# Patient Record
Sex: FEMALE | Race: WHITE | NOT HISPANIC OR LATINO | Employment: FULL TIME | URBAN - METROPOLITAN AREA
[De-identification: names, ages, dates, MRNs, and addresses within clinical notes are randomized per-mention and may not be internally consistent; named-entity substitution may affect disease eponyms.]

---

## 2020-08-24 ENCOUNTER — OFFICE VISIT (OUTPATIENT)
Dept: GASTROENTEROLOGY | Facility: CLINIC | Age: 35
End: 2020-08-24

## 2020-08-24 VITALS
DIASTOLIC BLOOD PRESSURE: 80 MMHG | HEART RATE: 104 BPM | TEMPERATURE: 97.8 F | WEIGHT: 119.6 LBS | SYSTOLIC BLOOD PRESSURE: 133 MMHG | HEIGHT: 64 IN | BODY MASS INDEX: 20.42 KG/M2

## 2020-08-24 DIAGNOSIS — K59.04 CHRONIC IDIOPATHIC CONSTIPATION: ICD-10-CM

## 2020-08-24 DIAGNOSIS — Z80.0 FAMILY HX OF COLON CANCER REQUIRING SCREENING COLONOSCOPY: ICD-10-CM

## 2020-08-24 DIAGNOSIS — R10.31 RIGHT LOWER QUADRANT ABDOMINAL PAIN: Primary | ICD-10-CM

## 2020-08-24 PROCEDURE — 99204 OFFICE O/P NEW MOD 45 MIN: CPT | Performed by: NURSE PRACTITIONER

## 2020-08-24 RX ORDER — LUBIPROSTONE 8 UG/1
8 CAPSULE ORAL 2 TIMES DAILY WITH MEALS
Qty: 60 CAPSULE | Refills: 11 | Status: SHIPPED | OUTPATIENT
Start: 2020-08-24

## 2020-08-24 RX ORDER — LAMOTRIGINE 150 MG/1
150 TABLET ORAL 2 TIMES DAILY
COMMUNITY

## 2020-08-24 NOTE — PROGRESS NOTES
GASTROENTEROLOGY OFFICE NOTE  Nery Saldivar  [id number]  1985    CARE TEAM  Patient Care Team:  Lili Chicas MD as PCP - General (Obstetrics and Gynecology)  Provider, No Known as PCP - Family Medicine    Referring Provider: Lili Chicas MD    Chief Complaint   Patient presents with   • Abdominal Pain        HISTORY OF PRESENT ILLNESS:  Nery is a 34-year-old female who presents today with complaints of chronic right lower quadrant abdominal pain that has been problematic for her for several years.  Her pain is more so in her side and flank but extends to her right lower quadrant at times.  She reports that she has had several CT scans over the years which only show constipation.  She has been evaluated by [facility]/g[facility]enterology in the past for this pain and most recently saw them again within the past month.  Her last colonoscopy was in , she was having the same pain at that time.  She reports the colonoscopy was normal.  She has a family history of colon cancer in her mother who was diagnosed and  at the age of 48 from colon cancer.    She reports that she has had this pain on and off for several years but in the past year she feels pain daily.  She does complain of constipation, having a bowel movement only once every week or so.  Previously she has been taking Linzess that helped for about 3 months but then seemed to lose its effectiveness.  At some point her formulary changed and it was not covered any longer.  She has since discontinued Linzess.  For the past month she has been taking Trulance 3 mg daily which has not helped her either.  She is giving herself enemas every 3 to 4 days to have a bowel movement.    She is recently seen her GYN for complaints of right lower quadrant pain.  There was discussion regarding possible exploratory lap for endometriosis but her gynecologist recommended further GI evaluation prior to proceeding.    PAST MEDICAL HISTORY  Past Medical History:    Diagnosis Date   • Constipation    • Mood disorder (CMS/HCC)         PAST SURGICAL HISTORY  History reviewed. No pertinent surgical history.     MEDICATIONS:    Current Outpatient Medications:   •  lamoTRIgine (LaMICtal) 150 MG tablet, Take 150 mg by mouth 2 (Two) Times a Day., Disp: , Rfl:   •  lubiprostone (Amitiza) 8 MCG capsule, Take 1 capsule by mouth 2 (Two) Times a Day With Meals., Disp: 60 capsule, Rfl: 11    ALLERGIES  No Known Allergies    FAMILY HISTORY:  Family History   Problem Relation Age of Onset   • Colon cancer Mother    • Colon polyps Maternal Aunt    • Colon polyps Paternal Aunt        SOCIAL HISTORY  Social History     Socioeconomic History   • Marital status: Single     Spouse name: Not on file   • Number of children: Not on file   • Years of education: Not on file   • Highest education level: Not on file   Tobacco Use   • Smoking status: Never Smoker   • Smokeless tobacco: Never Used   Substance and Sexual Activity   • Alcohol use: Yes     Comment: rarely   • Drug use: Never   • Sexual activity: Defer       REVIEW OF SYSTEMS  Review of Systems   Constitutional: Negative for activity change, appetite change, chills, diaphoresis, fatigue, fever, unexpected weight gain and unexpected weight loss.   HENT: Negative for congestion, dental problem, drooling, ear discharge, ear pain, facial swelling, hearing loss, mouth sores, nosebleeds, postnasal drip, rhinorrhea, sinus pressure, sneezing, sore throat, swollen glands, tinnitus, trouble swallowing and voice change.    Respiratory: Negative for apnea, cough, choking, chest tightness, shortness of breath, wheezing and stridor.    Cardiovascular: Negative for chest pain, palpitations and leg swelling.   Gastrointestinal: Positive for abdominal pain and constipation. Negative for abdominal distention, anal bleeding, blood in stool, diarrhea, nausea, rectal pain, vomiting, GERD and indigestion.   Endocrine: Negative for cold intolerance, heat  "intolerance, polydipsia, polyphagia and polyuria.   Musculoskeletal: Negative for arthralgias, back pain, gait problem, joint swelling, myalgias, neck pain, neck stiffness and bursitis.   Skin: Negative for color change, dry skin, rash and skin lesions.   Allergic/Immunologic: Negative for environmental allergies, food allergies and immunocompromised state.   Neurological: Negative for dizziness, tremors, seizures, syncope, facial asymmetry, speech difficulty, weakness, light-headedness, numbness, headache, memory problem and confusion.   Hematological: Negative for adenopathy. Does not bruise/bleed easily.   Psychiatric/Behavioral: Negative for agitation, behavioral problems, decreased concentration, dysphoric mood, hallucinations, self-injury, sleep disturbance, suicidal ideas, negative for hyperactivity, depressed mood and stress. The patient is not nervous/anxious.          PHYSICAL EXAM   /80 (BP Location: Right arm, Patient Position: Sitting, Cuff Size: Adult)   Pulse 104   Temp 97.8 °F (36.6 °C) (Temporal)   Ht 162.6 cm (64\")   Wt 54.3 kg (119 lb 9.6 oz)   BMI 20.53 kg/m²   Physical Exam   Constitutional: She is oriented to person, place, and time. She appears well-developed and well-nourished.   HENT:   Head: Normocephalic.   Mouth/Throat: Oropharynx is clear and moist.   Eyes: Pupils are equal, round, and reactive to light. EOM are normal.   Neck: Normal range of motion. Neck supple.   Cardiovascular: Normal rate and regular rhythm.   Pulmonary/Chest: Effort normal and breath sounds normal. She has no wheezes. She has no rales.   Abdominal: Soft. Bowel sounds are normal. She exhibits no mass. There is tenderness in the right lower quadrant. There is no rebound and no guarding. No hernia.   Musculoskeletal: Normal range of motion.   Neurological: She is alert and oriented to person, place, and time. No cranial nerve deficit.   Skin: Skin is warm and dry.   Psychiatric: She has a normal mood and " affect. Her behavior is normal. Judgment normal.   Nursing note and vitals reviewed.    Results Review:  None    ASSESSMENT / PLAN  1.  Constipation  2.  Right lower quadrant abdominal pain  -Begin Amitiza 8 mcg twice daily  3.  Family history of first-degree relative with colon cancer (at age 48)  Patient's last colonoscopy in 2014  -Colonoscopy for colorectal cancer screening    Return in about 4 weeks (around 9/21/2020).    I discussed the patients findings and my recommendations with patient    LORNA Horn

## 2020-08-26 RX ORDER — SODIUM, POTASSIUM,MAG SULFATES 17.5-3.13G
2 SOLUTION, RECONSTITUTED, ORAL ORAL TAKE AS DIRECTED
Qty: 354 ML | Refills: 0 | Status: SHIPPED | OUTPATIENT
Start: 2020-08-26

## 2020-08-28 ENCOUNTER — APPOINTMENT (OUTPATIENT)
Dept: PREADMISSION TESTING | Facility: HOSPITAL | Age: 35
End: 2020-08-28

## 2020-08-28 PROCEDURE — U0004 COV-19 TEST NON-CDC HGH THRU: HCPCS

## 2020-08-28 PROCEDURE — C9803 HOPD COVID-19 SPEC COLLECT: HCPCS

## 2020-08-28 PROCEDURE — U0002 COVID-19 LAB TEST NON-CDC: HCPCS

## 2020-08-29 LAB
REF LAB TEST METHOD: NORMAL
SARS-COV-2 RNA RESP QL NAA+PROBE: NOT DETECTED

## 2020-08-31 ENCOUNTER — OUTSIDE FACILITY SERVICE (OUTPATIENT)
Dept: GASTROENTEROLOGY | Facility: CLINIC | Age: 35
End: 2020-08-31

## 2020-08-31 PROCEDURE — 45378 DIAGNOSTIC COLONOSCOPY: CPT | Performed by: INTERNAL MEDICINE

## 2020-09-16 ENCOUNTER — TELEPHONE (OUTPATIENT)
Dept: OBSTETRICS AND GYNECOLOGY | Facility: CLINIC | Age: 35
End: 2020-09-16

## 2020-09-28 ENCOUNTER — OFFICE VISIT (OUTPATIENT)
Dept: GASTROENTEROLOGY | Facility: CLINIC | Age: 35
End: 2020-09-28

## 2020-09-28 VITALS
TEMPERATURE: 97.7 F | HEIGHT: 64 IN | BODY MASS INDEX: 21.72 KG/M2 | DIASTOLIC BLOOD PRESSURE: 72 MMHG | WEIGHT: 127.2 LBS | HEART RATE: 98 BPM | SYSTOLIC BLOOD PRESSURE: 120 MMHG

## 2020-09-28 DIAGNOSIS — R10.31 RIGHT LOWER QUADRANT ABDOMINAL PAIN: Primary | ICD-10-CM

## 2020-09-28 DIAGNOSIS — K59.04 CHRONIC IDIOPATHIC CONSTIPATION: ICD-10-CM

## 2020-09-28 PROCEDURE — 99213 OFFICE O/P EST LOW 20 MIN: CPT | Performed by: NURSE PRACTITIONER

## 2020-09-28 RX ORDER — AMITRIPTYLINE HYDROCHLORIDE 25 MG/1
1 TABLET, FILM COATED ORAL DAILY
COMMUNITY
Start: 2020-09-14

## 2020-09-28 RX ORDER — PLECANATIDE 3 MG/1
1 TABLET ORAL DAILY
COMMUNITY
Start: 2020-09-10

## 2020-09-28 RX ORDER — PROMETHAZINE HYDROCHLORIDE 25 MG/1
1 TABLET ORAL AS NEEDED
COMMUNITY
Start: 2020-06-22

## 2020-09-28 NOTE — PROGRESS NOTES
GASTROENTEROLOGY OFFICE NOTE  Nery Saldivar  [id number]  1985    CARE TEAM  Patient Care Team:  Lili Chicas MD as PCP - General (Obstetrics and Gynecology)  Provider, No Known as PCP - Family Medicine    Referring Provider: Lili Chicas MD    Chief Complaint   Patient presents with   • Abdominal Pain     colon f/u         HISTORY OF PRESENT ILLNESS:  Patient returns in follow-up status post colonoscopy for complaints of right lower quadrant abdominal pain and constipation.  She has had therapeutic failure to Linzess and now Trulance for constipation.  She is having to give herself a saline enema about once a week to have a bowel movement.  Her pain does not seem to be associated with constipation.  Colonoscopy was unremarkable, no etiology for complaint of pain.  Her pain continues to be persistent and fairly constant.  She has it on most days she reports that on a rare occasion she will have a day where she does not feel the pain.  It is in the right lower abdomen but mostly over into the right flank area.    PAST MEDICAL HISTORY  Past Medical History:   Diagnosis Date   • Calculus of kidney    • Constipation    • Mood disorder (CMS/HCC)         PAST SURGICAL HISTORY  Past Surgical History:   Procedure Laterality Date   • ADENOIDECTOMY     • FOOT SURGERY     • TONSILLECTOMY          MEDICATIONS:    Current Outpatient Medications:   •  amitriptyline (ELAVIL) 25 MG tablet, Take 1 tablet by mouth Daily., Disp: , Rfl:   •  lamoTRIgine (LaMICtal) 150 MG tablet, Take 150 mg by mouth 2 (Two) Times a Day., Disp: , Rfl:   •  lubiprostone (Amitiza) 8 MCG capsule, Take 1 capsule by mouth 2 (Two) Times a Day With Meals., Disp: 60 capsule, Rfl: 11  •  promethazine (PHENERGAN) 25 MG tablet, Take 1 tablet by mouth As Needed., Disp: , Rfl:   •  sodium-potassium-magnesium sulfates (Suprep Bowel Prep Kit) 17.5-3.13-1.6 GM/177ML solution oral solution, Take 2 bottles by mouth Take As Directed. Do Not Eat The Day  "Before Your Procedure. Call [phone] for questions., Disp: 354 mL, Rfl: 0  •  Trulance 3 MG tablet, Take 1 tablet by mouth Daily., Disp: , Rfl:     ALLERGIES  No Known Allergies    FAMILY HISTORY:  Family History   Problem Relation Age of Onset   • Colon cancer Mother    • Colon polyps Maternal Aunt    • Colon polyps Paternal Aunt    • Diabetes Other    • Heart disease Other    • Hypertension Other    • Stroke Other    • Cancer Other         Prostate   • Thyroid disease Other    • Other Other         Respiratory  Disease       SOCIAL HISTORY  Social History     Socioeconomic History   • Marital status: Single     Spouse name: Not on file   • Number of children: Not on file   • Years of education: Not on file   • Highest education level: Not on file   Tobacco Use   • Smoking status: Never Smoker   • Smokeless tobacco: Never Used   • Tobacco comment: current--status unknown--per--[facility]   Substance and Sexual Activity   • Alcohol use: Yes     Comment: rarely   • Drug use: Never   • Sexual activity: Not Currently     Partners: Female       REVIEW OF SYSTEMS  Review of Systems   Constitutional: Negative for activity change, appetite change, chills, diaphoresis, fatigue, fever, unexpected weight gain and unexpected weight loss.   HENT: Negative for trouble swallowing and voice change.    Gastrointestinal: Positive for abdominal pain and constipation. Negative for abdominal distention, anal bleeding, blood in stool, diarrhea, nausea, rectal pain, vomiting, GERD and indigestion.         PHYSICAL EXAM   /72 (BP Location: Right arm, Patient Position: Sitting, Cuff Size: Adult)   Pulse 98   Temp 97.7 °F (36.5 °C) (Temporal)   Ht 162.6 cm (64.02\")   Wt 57.7 kg (127 lb 3.2 oz)   BMI 21.82 kg/m²   Physical Exam  Vitals signs and nursing note reviewed.   Constitutional:       Appearance: Normal appearance. She is well-developed.   HENT:      Head: Normocephalic and atraumatic.      Nose: Nose normal.      " Mouth/Throat:      Mouth: Mucous membranes are moist.      Pharynx: Oropharynx is clear.   Eyes:      Pupils: Pupils are equal, round, and reactive to light.   Neck:      Musculoskeletal: Normal range of motion and neck supple.   Cardiovascular:      Rate and Rhythm: Normal rate and regular rhythm.   Pulmonary:      Effort: Pulmonary effort is normal.      Breath sounds: Normal breath sounds. No wheezing or rales.   Abdominal:      General: Bowel sounds are normal.      Palpations: Abdomen is soft. There is no mass.      Tenderness: There is no abdominal tenderness. There is no guarding or rebound.      Hernia: No hernia is present.   Musculoskeletal: Normal range of motion.   Skin:     General: Skin is warm and dry.   Neurological:      Mental Status: She is alert and oriented to person, place, and time.      Cranial Nerves: No cranial nerve deficit.   Psychiatric:         Behavior: Behavior normal.         Judgment: Judgment normal.       Results Review:  I have reviewed the patient's new clinical results.    ASSESSMENT / PLAN  1.  Right lower quadrant abdominal pain  - No GI etiology found  -Follow-up with GYN  2.  Constipation  -Begin Amitiza 24 mcg twice daily    Return in about 3 months (around 12/28/2020).    I discussed the patients findings and my recommendations with patient    LORNA Horn

## 2020-10-16 ENCOUNTER — OFFICE VISIT (OUTPATIENT)
Dept: OBSTETRICS AND GYNECOLOGY | Facility: CLINIC | Age: 35
End: 2020-10-16

## 2020-10-16 ENCOUNTER — APPOINTMENT (OUTPATIENT)
Dept: PREADMISSION TESTING | Facility: HOSPITAL | Age: 35
End: 2020-10-16

## 2020-10-16 VITALS
SYSTOLIC BLOOD PRESSURE: 118 MMHG | HEIGHT: 64 IN | BODY MASS INDEX: 21.21 KG/M2 | DIASTOLIC BLOOD PRESSURE: 66 MMHG | WEIGHT: 124.2 LBS

## 2020-10-16 DIAGNOSIS — N94.6 DYSMENORRHEA: ICD-10-CM

## 2020-10-16 DIAGNOSIS — Z01.818 PREOPERATIVE EXAMINATION: Primary | ICD-10-CM

## 2020-10-16 PROCEDURE — 99213 OFFICE O/P EST LOW 20 MIN: CPT | Performed by: OBSTETRICS & GYNECOLOGY

## 2020-10-16 PROCEDURE — C9803 HOPD COVID-19 SPEC COLLECT: HCPCS

## 2020-10-16 PROCEDURE — U0004 COV-19 TEST NON-CDC HGH THRU: HCPCS

## 2020-10-16 NOTE — PROGRESS NOTES
Pre-Op Visit    Chief Complaint   Patient presents with   • Pre-op Exam       HPI  Nery Saldivar is 35 y.o. No obstetric history on file. scheduled to have Diagnostic Lap with CO2 laser at [facility] on 10/19/2020 at 0900.  Her pre operative diagnosis.  Her dx is Dysmenorrhea. Her last menstrual period was Patient's last menstrual period was 10/14/2020 (exact date)..  Her birth control method is no method. Her BMI is Body mass index is 21.32 kg/m²..    She has review the informational video on laparoscopy.        She understand the risks of bleeding, infections, possible damage to other organ systems, including but not limited to the gastrointestinal tract and genitourinary tract. She also understands the specific risks listed in the pre op information (video ,pamphlets, etc.)    She has review and signed the pre op consent form.    She has been instructed to have a light dinner the night before surgery, then nothing to eat or drink after midnight.  She is on the following medications:  Outpatient Encounter Medications as of 10/16/2020   Medication Sig Dispense Refill   • amitriptyline (ELAVIL) 25 MG tablet Take 1 tablet by mouth Daily.     • lamoTRIgine (LaMICtal) 150 MG tablet Take 150 mg by mouth 2 (Two) Times a Day.     • lubiprostone (Amitiza) 8 MCG capsule Take 1 capsule by mouth 2 (Two) Times a Day With Meals. 60 capsule 11   • promethazine (PHENERGAN) 25 MG tablet Take 1 tablet by mouth As Needed.     • sodium-potassium-magnesium sulfates (Suprep Bowel Prep Kit) 17.5-3.13-1.6 GM/177ML solution oral solution Take 2 bottles by mouth Take As Directed. Do Not Eat The Day Before Your Procedure. Call [phone] for questions. 354 mL 0   • Trulance 3 MG tablet Take 1 tablet by mouth Daily.       No facility-administered encounter medications on file as of 10/16/2020.          The day of surgery, do not chew gum or smoke. Remove all jewelry, nail polish and contact lens prior to coming to the  "hospital. Do not bring large sums of money or valuables.  Arrive at the hospital at 10:30 am.  You will talk with the anesthesiologist that morning.  An IV will be started to provide fluids and sedation.  The procedure will take approximately 3 hour(s).  You will need to have someone drive you home after the surgery.    She has confirmed that she is not allergic to latex.    The additional following portions of the patient's history were reviewed and updated as appropriate: allergies, current medications, past family history, past medical history, past social history, past surgical history and problem list.    Review of Systems    I have reviewed and agree with the HPI, ROS, and historical information as entered above. Lili Chicas MD    Objective   /66   Ht 162.6 cm (64\")   Wt 56.3 kg (124 lb 3.2 oz)   LMP 10/14/2020 (Exact Date)   BMI 21.32 kg/m²     Physical Exam  Vitals signs and nursing note reviewed. Exam conducted with a chaperone present.   Constitutional:       Appearance: She is well-developed.   HENT:      Head: Normocephalic and atraumatic.   Neck:      Musculoskeletal: Normal range of motion. No muscular tenderness.   Cardiovascular:      Rate and Rhythm: Normal rate and regular rhythm.   Pulmonary:      Effort: Pulmonary effort is normal.      Breath sounds: Normal breath sounds.   Abdominal:      General: Bowel sounds are normal.      Palpations: Abdomen is soft. Abdomen is not rigid.   Skin:     General: Skin is warm and dry.   Neurological:      Mental Status: She is alert and oriented to person, place, and time.   Psychiatric:         Behavior: Behavior normal.         Assessment/Plan       Dysmenorrhea    Plan:  Orders Placed This Encounter   Procedures   • CBC (No Diff)   • HCG, B-subunit, Quantitative       Instructions:  1. Pre op labs done in our office  2. PAT scheduled at [facility]  3. Review with the patient the risk of bleeding, infections, possible damage to " other organ systems, including but not limited to the gastrointestinal tract and genitourinary tract.  Reviewed the risk of anesthesia as well as the risk the surgery will not produce the desired results.  Operative permit signed and scanned into the chart.  4. Reviewed the risk of perforation of the uterus.  Risk of not finding etiology of her pain  5. [facility] report has been reviewed.  6. Pain Medication Consent Form has been signed.  A review regarding proper medication administration; impact on driving and working while medicated; the safety of use in pregnancy; the potential for overdose; and the proper disposal and stroage of controlled medications has been done with the patient.  7. Electronically Identified Patient Education Materials provided electronically      Lili Chicas MD

## 2020-10-17 LAB
ERYTHROCYTE [DISTWIDTH] IN BLOOD BY AUTOMATED COUNT: 12.3 % (ref 12.3–15.4)
HCG INTACT+B SERPL-ACNC: <0.5 MIU/ML
HCT VFR BLD AUTO: 43.4 % (ref 34–46.6)
HGB BLD-MCNC: 14.7 G/DL (ref 12–15.9)
MCH RBC QN AUTO: 29.6 PG (ref 26.6–33)
MCHC RBC AUTO-ENTMCNC: 33.9 G/DL (ref 31.5–35.7)
MCV RBC AUTO: 87.5 FL (ref 79–97)
PLATELET # BLD AUTO: 248 10*3/MM3 (ref 140–450)
RBC # BLD AUTO: 4.96 10*6/MM3 (ref 3.77–5.28)
SARS-COV-2 RNA RESP QL NAA+PROBE: NOT DETECTED
WBC # BLD AUTO: 6.29 10*3/MM3 (ref 3.4–10.8)

## 2020-10-19 ENCOUNTER — OUTSIDE FACILITY SERVICE (OUTPATIENT)
Dept: OBSTETRICS AND GYNECOLOGY | Facility: CLINIC | Age: 35
End: 2020-10-19

## 2020-10-19 PROCEDURE — 58662 LAPAROSCOPY EXCISE LESIONS: CPT | Performed by: OBSTETRICS & GYNECOLOGY

## 2020-11-03 ENCOUNTER — OFFICE VISIT (OUTPATIENT)
Dept: OBSTETRICS AND GYNECOLOGY | Facility: CLINIC | Age: 35
End: 2020-11-03

## 2020-11-03 VITALS — WEIGHT: 124 LBS | DIASTOLIC BLOOD PRESSURE: 70 MMHG | BODY MASS INDEX: 21.28 KG/M2 | SYSTOLIC BLOOD PRESSURE: 116 MMHG

## 2020-11-03 DIAGNOSIS — N80.9 ENDOMETRIOSIS: ICD-10-CM

## 2020-11-03 DIAGNOSIS — Z09 POSTOPERATIVE FOLLOW-UP: Primary | ICD-10-CM

## 2020-11-03 PROCEDURE — 99024 POSTOP FOLLOW-UP VISIT: CPT | Performed by: OBSTETRICS & GYNECOLOGY

## 2020-11-03 RX ORDER — DESOGESTREL AND ETHINYL ESTRADIOL 21-5 (28)
1 KIT ORAL DAILY
Qty: 28 TABLET | Refills: 12 | Status: SHIPPED | OUTPATIENT
Start: 2020-11-03 | End: 2021-11-03

## 2020-11-03 NOTE — PROGRESS NOTES
Subjective   Chief Complaint   Patient presents with   • Post-op Follow-up     Nery Saldivar is a 35 y.o. year old No obstetric history on file. presenting to be seen for her post-operative visit. She had a Dx Lap/Laser on 10/19/20 for pelvic pain, dysmenorrhea.  Currently she reports no problems with eating, bowel movements, voiding, or wound drainage and pain is well controlled. She has had some itching and a rash on abdomen since surgery. Symptoms are improving.    There was no pathology result associated with Nery's recent procedure.      OTHER THINGS SHE WANTS TO DISCUSS TODAY:  Nothing else    The following portions of the patient's history were reviewed and updated as appropriate:current medications and allergies       Objective   LMP 10/14/2020 (Exact Date)     General:  well developed; well nourished  no acute distress   \   Abdomen: soft, non-tender; no masses   Incisions well healed   Pelvis: Not performed.          Assessment   1. S/P laparoscopy     Plan   1. May return to full activity with no restrictions  2. The importance of keeping all planned follow-up and taking all medications as prescribed was emphasized.  3. Endometriosis - discussed intraoperative findings and recommended hormonal suppression.  Discussed options and will start MARI.  4. Discussed benefits and risks of MARI including headache, nausea and breast tenderness in first several cycles.  Also discussed inherent risks of VTE and increased lifetime risk of breast cancer diagnosis with any hormonal birth control.  She can expect some BTB in the first 6-12 months of taking a combined oral contraceptive pill.  Encouraged to continue for 3-6 months before changing to a new pill.  Follow up 6 months     This note was electronically signed.    November 3, 2020    Note: Speech recognition transcription software may have been used to create portions of this document.  An attempt at proofreading has been made but errors in transcription  could still be present.

## 2024-10-04 ENCOUNTER — OFFICE VISIT (OUTPATIENT)
Dept: OBSTETRICS AND GYNECOLOGY | Facility: CLINIC | Age: 39
End: 2024-10-04
Payer: COMMERCIAL

## 2024-10-04 VITALS
HEIGHT: 64 IN | DIASTOLIC BLOOD PRESSURE: 70 MMHG | SYSTOLIC BLOOD PRESSURE: 102 MMHG | WEIGHT: 142 LBS | BODY MASS INDEX: 24.24 KG/M2

## 2024-10-04 DIAGNOSIS — Z12.39 ENCOUNTER FOR BREAST CANCER SCREENING USING NON-MAMMOGRAM MODALITY: ICD-10-CM

## 2024-10-04 DIAGNOSIS — N92.6 IRREGULAR PERIODS: ICD-10-CM

## 2024-10-04 DIAGNOSIS — Z01.419 WOMEN'S ANNUAL ROUTINE GYNECOLOGICAL EXAMINATION: Primary | ICD-10-CM

## 2024-10-04 NOTE — PROGRESS NOTES
Gynecologic Annual Exam Note          GYN Annual Exam     Annual        Subjective     HPI  Nery Saldivar is a 39 y.o. female, , who presents for annual well woman exam as a previous patient not seen in the last three years. There were no changes to her medical or surgical history since her last visit..  Patient's last menstrual period was 2024 (approximate).   Her periods are irregular, occurring every 2 weeks, lasting x 4 days.   She reports they have been occurring every two weeks since . The flow is moderate.  Prior to , her periods were regular, every 25-35 days, lasting 4-5 days.  She reports dysmenorrhea is moderate occurring premenstrually and first 1-2 days of flow. Marital Status: single. She is not currently sexually active. STD testing recommendations have been explained to the patient and she declines STD testing.    The patient would like to discuss the following complaints today: irregular periods and clitoral sensitivity    Additional OB/GYN History   contraceptive methods: None  Desires to: do not start contraception  History of migraines: no    Last Pap : 2014. Result: negative. HPV:  not done .   Last Completed Pap Smear       This patient has no relevant Health Maintenance data.          History of abnormal Pap smear: no  Family history of uterine, colon, breast, or ovarian cancer: yes - Paternal Aunt- Breast, Mother- Colon  Performs monthly Self-Breast Exam: no  Last mammogram: 2023. Done at [facility]. There is a copy in the chart.    Last Completed Mammogram            MAMMOGRAM (Every 2 Years) Next due on 2023  Mammo Diagnostic Digital Tomosynthesis Bilateral With CAD                    Colonoscopy: has had a colonoscopy 4-5 years ago- normal  Exercises Regularly: yes  Feelings of Anxiety or Depression: yes - managed  Tobacco Usage?: No       Current Outpatient Medications:   •  amitriptyline (ELAVIL) 25 MG tablet, Take 1 tablet by mouth  Daily., Disp: , Rfl:   •  lamoTRIgine (LaMICtal) 150 MG tablet, Take 1 tablet by mouth 2 (Two) Times a Day., Disp: , Rfl:   •  desogestrel-ethinyl estradiol (Kariva) 0.15-0.02/0.01 MG () per tablet, Take 1 tablet by mouth Daily., Disp: 28 tablet, Rfl: 12  •  lubiprostone (Amitiza) 8 MCG capsule, Take 1 capsule by mouth 2 (Two) Times a Day With Meals., Disp: 60 capsule, Rfl: 11  •  promethazine (PHENERGAN) 25 MG tablet, Take 1 tablet by mouth As Needed., Disp: , Rfl:   •  sodium-potassium-magnesium sulfates (Suprep Bowel Prep Kit) 17.5-3.13-1.6 GM/177ML solution oral solution, Take 2 bottles by mouth Take As Directed. Do Not Eat The Day Before Your Procedure. Call [phone] for questions., Disp: 354 mL, Rfl: 0  •  Trulance 3 MG tablet, Take 1 tablet by mouth Daily., Disp: , Rfl:      Patient denies the need for medication refills today.    OB History          0    Para   0    Term   0       0    AB   0    Living   0         SAB   0    IAB   0    Ectopic   0    Molar   0    Multiple   0    Live Births   0                Past Medical History:   Diagnosis Date   • Calculus of kidney    • Constipation    • Mood disorder         Past Surgical History:   Procedure Laterality Date   • ADENOIDECTOMY     • DIAGNOSTIC LAPAROSCOPY  10/19/2020    with Laser- LOS   • FOOT SURGERY     • TONSILLECTOMY         Health Maintenance   Topic Date Due   • Annual Gynecologic Pelvic and Breast Exam  Never done   • Pneumococcal Vaccine 0-64 (1 of 2 - PCV) Never done   • TDAP/TD VACCINES (1 - Tdap) Never done   • ANNUAL PHYSICAL  Never done   • PAP SMEAR  Never done   • INFLUENZA VACCINE  2024   • COVID-19 Vaccine ( season) 2024   • MAMMOGRAM  2025   • HEPATITIS C SCREENING  Completed       The additional following portions of the patient's history were reviewed and updated as appropriate: allergies, current medications, past family history, past medical history, past social history, past  "surgical history, and problem list.    Review of Systems   Constitutional: Negative.    HENT: Negative.     Eyes: Negative.    Respiratory: Negative.     Cardiovascular: Negative.    Gastrointestinal: Negative.    Endocrine: Negative.    Genitourinary:  Positive for menstrual problem (irregular periods- every two weeks).   Musculoskeletal: Negative.    Skin: Negative.    Allergic/Immunologic: Negative.    Neurological: Negative.    Hematological: Negative.    Psychiatric/Behavioral:  The patient is nervous/anxious (managed).          I have reviewed and agree with the HPI, ROS, and historical information as entered above. Lili Chicas MD          Objective   /70   Ht 162.6 cm (64\")   Wt 64.4 kg (142 lb)   LMP 09/18/2024 (Approximate)   BMI 24.37 kg/m²     Physical Exam  Vitals and nursing note reviewed. Exam conducted with a chaperone present.   Constitutional:       Appearance: She is well-developed.   HENT:      Head: Normocephalic and atraumatic.   Neck:      Thyroid: No thyroid mass or thyromegaly.   Cardiovascular:      Rate and Rhythm: Normal rate and regular rhythm.      Heart sounds: No murmur heard.  Pulmonary:      Effort: Pulmonary effort is normal. No retractions.      Breath sounds: Normal breath sounds. No wheezing, rhonchi or rales.   Chest:      Chest wall: No mass or tenderness.   Breasts:     Right: Normal. No mass, nipple discharge, skin change or tenderness.      Left: Normal. No mass, nipple discharge, skin change or tenderness.   Abdominal:      General: Bowel sounds are normal.      Palpations: Abdomen is soft. Abdomen is not rigid. There is no mass.      Tenderness: There is no abdominal tenderness. There is no guarding.      Hernia: No hernia is present. There is no hernia in the left inguinal area.   Genitourinary:     Labia:         Right: No rash, tenderness or lesion.         Left: No rash, tenderness or lesion.       Vagina: Normal. No vaginal discharge or lesions.      " Cervix: No cervical motion tenderness, discharge, lesion or cervical bleeding.      Uterus: Normal. Not enlarged, not fixed and not tender.       Adnexa:         Right: No mass or tenderness.          Left: No mass or tenderness.        Rectum: No external hemorrhoid.   Musculoskeletal:      Cervical back: Normal range of motion. No muscular tenderness.   Neurological:      Mental Status: She is alert and oriented to person, place, and time.   Psychiatric:         Behavior: Behavior normal.            Assessment and Plan    Problem List Items Addressed This Visit    None  Visit Diagnoses       Women's annual routine gynecological examination    -  Primary    Encounter for breast cancer screening using non-mammogram modality        Irregular periods        Relevant Orders    LIQUID-BASED PAP SMEAR WITH HPV GENOTYPING REGARDLESS OF INTERPRETATION ([facility])    Estradiol    Follicle Stimulating Hormone    Testosterone    Testosterone Free Direct    CBC & Differential    TSH    US Non-ob Transvaginal            GYN annual well woman exam.   Pap guidelines reviewed.  Recommended use of Vitamin D replacement and getting adequate calcium in her diet. (1500mg)  Reviewed monthly self breast exams.  Instructed to call with lumps, pain, or breast discharge.    Continue yearly mammography  Reviewed HPV guidelines.  AUB - will return for ultrasound and possible endometrial biopsy. Check labs today.  Clitoral sensitivity - labs today nothing on clinical exam   Return in about 2 weeks (around 10/18/2024) for US with Next Visit.     Lili Chicas MD  10/04/2024

## 2024-10-05 LAB
BASOPHILS # BLD AUTO: 0.07 10*3/MM3 (ref 0–0.2)
BASOPHILS NFR BLD AUTO: 0.8 % (ref 0–1.5)
EOSINOPHIL # BLD AUTO: 0.2 10*3/MM3 (ref 0–0.4)
EOSINOPHIL NFR BLD AUTO: 2.3 % (ref 0.3–6.2)
ERYTHROCYTE [DISTWIDTH] IN BLOOD BY AUTOMATED COUNT: 12.6 % (ref 12.3–15.4)
ESTRADIOL SERPL-MCNC: 199 PG/ML
FSH SERPL-ACNC: 9.6 MIU/ML
HCT VFR BLD AUTO: 41.4 % (ref 34–46.6)
HGB BLD-MCNC: 14 G/DL (ref 12–15.9)
IMM GRANULOCYTES # BLD AUTO: 0.04 10*3/MM3 (ref 0–0.05)
IMM GRANULOCYTES NFR BLD AUTO: 0.5 % (ref 0–0.5)
LYMPHOCYTES # BLD AUTO: 2.24 10*3/MM3 (ref 0.7–3.1)
LYMPHOCYTES NFR BLD AUTO: 25.9 % (ref 19.6–45.3)
MCH RBC QN AUTO: 29.8 PG (ref 26.6–33)
MCHC RBC AUTO-ENTMCNC: 33.8 G/DL (ref 31.5–35.7)
MCV RBC AUTO: 88.1 FL (ref 79–97)
MONOCYTES # BLD AUTO: 0.58 10*3/MM3 (ref 0.1–0.9)
MONOCYTES NFR BLD AUTO: 6.7 % (ref 5–12)
NEUTROPHILS # BLD AUTO: 5.53 10*3/MM3 (ref 1.7–7)
NEUTROPHILS NFR BLD AUTO: 63.8 % (ref 42.7–76)
NRBC BLD AUTO-RTO: 0 /100 WBC (ref 0–0.2)
PLATELET # BLD AUTO: 313 10*3/MM3 (ref 140–450)
RBC # BLD AUTO: 4.7 10*6/MM3 (ref 3.77–5.28)
TESTOST FREE SERPL-MCNC: 2.2 PG/ML (ref 0–4.2)
TESTOST SERPL-MCNC: 25 NG/DL (ref 8–60)
TSH SERPL DL<=0.005 MIU/L-ACNC: 1.16 UIU/ML (ref 0.27–4.2)
WBC # BLD AUTO: 8.66 10*3/MM3 (ref 3.4–10.8)

## 2024-10-09 LAB — REF LAB TEST METHOD: NORMAL

## 2024-11-01 ENCOUNTER — OFFICE VISIT (OUTPATIENT)
Dept: OBSTETRICS AND GYNECOLOGY | Facility: CLINIC | Age: 39
End: 2024-11-01
Payer: COMMERCIAL

## 2024-11-01 VITALS
WEIGHT: 143 LBS | BODY MASS INDEX: 24.41 KG/M2 | SYSTOLIC BLOOD PRESSURE: 110 MMHG | HEIGHT: 64 IN | DIASTOLIC BLOOD PRESSURE: 68 MMHG

## 2024-11-01 DIAGNOSIS — N94.6 DYSMENORRHEA: Primary | ICD-10-CM

## 2024-11-01 DIAGNOSIS — N93.9 ABNORMAL UTERINE BLEEDING (AUB): ICD-10-CM

## 2024-11-01 NOTE — PROGRESS NOTES
"     Gynecologic Procedure Note        Endometrial Biopsy      Indications: Nery Saldivar is a 39 y.o. , who presents today for endometrial biopsy.  The patient was noted to have Abnormal Uterine Bleeding.  Her LMP is Patient's last menstrual period was 2024 (approximate). . After being presented with the risk, benefits, and specific detail of the procedure, the patient wished to proceed.  Written consent was obtained from patient.   Urine pregnancy test was Not indicated and pt had U/S. Patient does not have an allergy to betadine or shellfish.     Procedure Details     Time out: immediate members of the procedure team and patient agree to the following: correct patient, correct site, correct procedure to be performed. Lili Chicas MD      The patient was placed on the table in the dorsal lithotomy position.  She was draped in the appropriate manner.  A speculum was placed in the vagina.  The cervix was visualized and prepped with Betadine.  A tenaculum was placed on the anterior lip of the cervix for traction.  A small plastic 5 mm Pipelle syringe curette was inserted into the cervical canal.  The cervix could not be cannulated.   The patient tolerated the procedure well and she reported mild cramping.  The tenaculum was removed from the cervix and the speculum was removed.  The patient was observed for 5 minutes.           Complications: none.     Procedures    Review of Systems  /68   Ht 162.6 cm (64\")   Wt 64.9 kg (143 lb)   LMP 2024 (Approximate)   BMI 24.55 kg/m²       Plan:  Orders Placed This Encounter   Procedures    US Non-ob Transvaginal     Standing Status:   Future     Standing Expiration Date:   2025     Order Specific Question:   Reason for Exam:     Answer:   Recheck and Ovarian Cyst     Order Specific Question:   Release to patient     Answer:   Routine Release [[id number]]       Problem List Items Addressed This Visit          Genitourinary and Reproductive "     Dysmenorrhea - Primary    Relevant Orders    TISSUE EXAM, [facility] ([facility])     Other Visit Diagnoses       Abnormal uterine bleeding (AUB)        Relevant Orders    US Non-ob Transvaginal                Instructions  U/S reviewed.  Cervical calcified Nabothian cyst vs polyp seen. Complex cyst in left ovary most likely pedunculated corpus luteal cyst.    AUB - given inability to sample endometrium will plan Dx hysteroscopy D&C with Myosure.  However will repeat u/s first to be sure no action needed there.vaginal bleeding heavier than a period, foul vaginal discharge or pain.  F/U in 6 weeks     Lili Chicas MD  11/01/2024

## 2024-12-20 ENCOUNTER — OFFICE VISIT (OUTPATIENT)
Dept: OBSTETRICS AND GYNECOLOGY | Facility: CLINIC | Age: 39
End: 2024-12-20
Payer: COMMERCIAL

## 2024-12-20 VITALS
DIASTOLIC BLOOD PRESSURE: 68 MMHG | SYSTOLIC BLOOD PRESSURE: 102 MMHG | WEIGHT: 147 LBS | HEIGHT: 64 IN | BODY MASS INDEX: 25.1 KG/M2

## 2024-12-20 DIAGNOSIS — N93.9 ABNORMAL UTERINE BLEEDING (AUB): ICD-10-CM

## 2024-12-20 DIAGNOSIS — N94.6 DYSMENORRHEA: ICD-10-CM

## 2024-12-20 DIAGNOSIS — Z12.31 BREAST CANCER SCREENING BY MAMMOGRAM: Primary | ICD-10-CM

## 2024-12-20 NOTE — PROGRESS NOTES
Chief Complaint   Patient presents with    Dysmenorrhea       Subjective   HPI  Nery Saldivar is a 39 y.o. female, . Her last LMP was No LMP recorded.. who presents for follow up on dysmenorrhea, abnormal uterine bleeding and ovarian cysts.  .      At her last visit she was treated endometrial bx was unsuccessful and plan was to repeat u/s for cyst resolution prior to proceeding with Myosure D&C.  She continues to have pain with her cycles and IMB.  U/S today shows resolution of previous cyst but new right ovarian cyst.    In addition she has noted left breast pain.  She is due for a mammogram       OB/GYN History     Last Pap :   Last Completed Pap Smear            PAP SMEAR (Every 3 Years) Next due on 10/4/2027      10/04/2024  LIQUID-BASED PAP SMEAR WITH HPV GENOTYPING REGARDLESS OF INTERPRETATION ([facility])                    Last mammogram:   Last Completed Mammogram            Ordered - MAMMOGRAM (Every 2 Years) Ordered on 10/4/2024      2023  MAMMO DIAGNOSTIC DIGITAL TOMOSYNTHESIS BILATERAL W CAD                    Tobacco Usage?: No   OB History          0    Para   0    Term   0       0    AB   0    Living   0         SAB   0    IAB   0    Ectopic   0    Molar   0    Multiple   0    Live Births   0                  Current Outpatient Medications:     amitriptyline (ELAVIL) 25 MG tablet, Take 1 tablet by mouth Daily., Disp: , Rfl:     lamoTRIgine (LaMICtal) 150 MG tablet, Take 1 tablet by mouth 2 (Two) Times a Day., Disp: , Rfl:     lubiprostone (Amitiza) 8 MCG capsule, Take 1 capsule by mouth 2 (Two) Times a Day With Meals., Disp: 60 capsule, Rfl: 11    promethazine (PHENERGAN) 25 MG tablet, Take 1 tablet by mouth As Needed., Disp: , Rfl:     sodium-potassium-magnesium sulfates (Suprep Bowel Prep Kit) 17.5-3.13-1.6 GM/177ML solution oral solution, Take 2 bottles by mouth Take As Directed. Do Not Eat The Day Before Your Procedure. Call [phone] for  "questions., Disp: 354 mL, Rfl: 0    Trulance 3 MG tablet, Take 1 tablet by mouth Daily., Disp: , Rfl:     desogestrel-ethinyl estradiol (Kariva) 0.15-0.02/0.01 MG (21/5) per tablet, Take 1 tablet by mouth Daily., Disp: 28 tablet, Rfl: 12     Past Medical History:   Diagnosis Date    Calculus of kidney     Constipation     Mood disorder         Past Surgical History:   Procedure Laterality Date    ADENOIDECTOMY      DIAGNOSTIC LAPAROSCOPY  10/19/2020    with Laser- LOS    FOOT SURGERY      TONSILLECTOMY         The additional following portions of the patient's history were reviewed and updated as appropriate: allergies and current medications.    Review of Systems    I have reviewed and agree with the HPI, ROS, and historical information as entered above. Lili Chicas MD      Objective   /68   Ht 162.6 cm (64\")   Wt 66.7 kg (147 lb)   BMI 25.23 kg/m²     Physical Exam  Exam conducted with a chaperone present.   Constitutional:       Appearance: She is well-developed.   HENT:      Head: Normocephalic.   Eyes:      Conjunctiva/sclera: Conjunctivae normal.   Pulmonary:      Effort: Pulmonary effort is normal.   Chest:   Breasts:     Right: Normal. No swelling, mass, nipple discharge or skin change.      Left: Normal. No swelling, mass, nipple discharge or skin change.   Psychiatric:         Behavior: Behavior normal.         Assessment & Plan     Assessment     Problem List Items Addressed This Visit          Genitourinary and Reproductive     Dysmenorrhea     Other Visit Diagnoses       Breast cancer screening by mammogram    -  Primary    Relevant Orders    Mammo Screening Digital Tomosynthesis Bilateral With CAD    Abnormal uterine bleeding (AUB)                  Plan     Left breast pain - clinical exam was normal.  She is due for screening mammogram  AUB - she would like to proceed with hysteroscopy Myosure D&C.  Painful periods/Ovarian cysts - she is not certain she want to have laparoscopic " evaluation for endometriosis.  She knows it is an option at time of surgery for her AUB  New right ovarian cyst - repeat imaging in 3-6 months.  F/U for preop.        Lili Chicas MD  12/20/2024

## 2025-01-27 ENCOUNTER — HOSPITAL ENCOUNTER (OUTPATIENT)
Facility: HOSPITAL | Age: 40
Discharge: HOME | End: 2025-01-27
Admitting: OBSTETRICS & GYNECOLOGY
Payer: COMMERCIAL

## 2025-01-27 DIAGNOSIS — Z12.31 BREAST CANCER SCREENING BY MAMMOGRAM: ICD-10-CM

## 2025-01-27 LAB
NCCN CRITERIA FLAG: NORMAL
TYRER CUZICK SCORE: 19

## 2025-01-27 PROCEDURE — 77067 SCR MAMMO BI INCL CAD: CPT

## 2025-01-27 PROCEDURE — 77063 BREAST TOMOSYNTHESIS BI: CPT

## 2025-01-29 PROCEDURE — 77067 SCR MAMMO BI INCL CAD: CPT | Performed by: RADIOLOGY

## 2025-01-29 PROCEDURE — 77063 BREAST TOMOSYNTHESIS BI: CPT | Performed by: RADIOLOGY

## 2025-01-30 DIAGNOSIS — Z91.89 INCREASED RISK OF BREAST CANCER: Primary | ICD-10-CM
